# Patient Record
(demographics unavailable — no encounter records)

---

## 2018-01-11 NOTE — MISCELLANEOUS
History of Present Illness  COPD Hospital Discharge Initial Follow-Up Call: The patient is being contacted for follow-up after hospitalization  The date of discharge is 6/19/2016  I spoke with Elvin Villanueva lead [address] [place] [address] [facility]   The patient was discharged to [facility]  The patient is a 61 PPY smoker  The patient is experiencing the following symptoms: dyspnea and fever, but no cough  Pulse oximetry keep sats >90% and with oxygen at 2 LPM    Zone tool status is yellow  Narrative Summary:    Spoke with lead [address] [place] [address] Road who reports pt is having some mild ADAMS, O2 to keep sats >90%  Receiving neb medications of Atorvent, Pulmicort and Xopenex as well as prednisone and azithromycin  Having sanguinous pinpt drng from old (Jan, 2016) L)lateral ankle ORIF site as well as low grade temps  Seen by facility CRNP today, orders for sputum and blood cultures as well as CXR and ankle xray received  Adm assessment due 6/24-- progress on goals of therapy, ADL's will be discussed at first team meeting        Signatures   Electronically signed by : Mirian Garcia, ; Jun 22 2016  1:14PM EST                       (Author)

## 2018-01-12 NOTE — MISCELLANEOUS
History of Present Illness  HPI: Pt was discharged to [facility]  Attempted to call for RAMU SUERO call-- unit phone number rang several times without an answer  Will reach out to [facility] to attempt to establish       [address] Discharge Initial Follow-Up Call: The patient is being contacted for follow-up after hospitalization  The date of discharge is 6/17/2016  The patient was discharged to [facility]           Signatures   Electronically signed by : Ophelia Gallego, ; Jun 20 2016  3:50PM EST                       (Author)

## 2018-01-16 NOTE — PROCEDURES
Procedures by MARY KAY Henry at 5/11/2016  12:20 PM      Author:  MARY KAY Avina Service:  (none) Author Type:  Speech and Language Pathologist     Filed:  5/11/2016  1:27 PM Date of Service:  5/11/2016 12:20 PM Status:  Signed     :  MARY KAY Henry (Speech and Language Pathologist)         Procedures:       1  FL BARIUM Marlinda Conroy SPEECH [[id number] (Custom)]                                                      Video Swallow Study      Patient Name: Spencer Vital  PKJBN'N Date: 5/11/2016  par  par  Past Medical History  Past Medical History      Diagnosis   Date    A-fib       no AC d/t fall risk     Acute on chronic respiratory failure with hypoxemia      Ambulatory dysfunction      Anemia of chronic disease      CAD (coronary artery disease)      Cardiac disease      CHF (congestive heart failure)       diastolic     CKD (chronic kidney disease) stage 3, GFR 30-59 ml/min      COPD (chronic obstructive pulmonary disease)      GERD (gastroesophageal reflux disease)      History of tobacco abuse      HLD (hyperlipidemia)      Hyperglycemia      Hypertension      Peripheral neuropathy      PVD (peripheral vascular disease)      Urinary retention      Vitamin D deficiency          Past Surgical History  Past Surgical History       Procedure   Laterality Date    Appendectomy       Cholecystectomy       Cataract extraction       Renal artery stent  Left     Abdominal aortic aneurysm repair       Coronary angioplasty with stent placement   2007     Circumflex      Myringotomy       Neck surgery        To remove the soft tissue mass      Orif ankle fracture            General Information:    67 yo female referred  for a VBS by Jose Sosa PA-C for dysphagia, r/o aspiration and determine risk  Cognition: awake, alert  Cooperative  Speech/Swallow Mech: Oral motor movements appeared  WNL; Dentition was adequate; Cough was strong   Respiratory Status: 3L on NC;   Current diet: dysphagia 3 w/ NTL  Prior VBS none  Pt was seen in radiology for a Video Barium Swallow Study, seated in the upright position and viewed laterally with the following consistencies: puree, soft, HTL, NTL, thin liquid, whole pill in applesauce  Results are as follows:           Oral Stage:   bolus retrieval, mastication, manipulation, oral control of liquids by cup and straw appeard WNL  Pharyngeal Stage:   Swallow iniation was timely w/ complete airway closure  Mild pharyngeal retention around vallecula, cleared w/ liquid wash  Single episode of silent aspiration noted w/ thin liquids-delayed cough w/ expectoration of mucous; no further episodes  of penetration or aspiration noted w/ several oz by cup and straw  No difficulty w/ whole pill in puree  Esophageal Stage:   screened, WFL       Assessment Summary:   Oral and pharyngeal stages appear WFL, w/ single episode of small amount of silent aspiration w/ thin liquids  No significant risk is noted       Diagnosis/Prognosis:            Recommendations:   Dysphagia level 3 diet w/ thin liquids  Small, single sips of thin liquids, espec when SOB  Cont meds whole in puree  Aspiration precautions  Will cont to follow                           Received for:Provider  EPIC   May 11 2016  1:27PM [facility] Standard Time

## 2018-03-07 NOTE — MISCELLANEOUS
History of Present Illness  COPD Subsequent Hospital Discharge Phone Calls: The patient is being contacted for continued follow-up after hospitalization  The patient was discharged from the hospital on 6/19/16  I spoke with Eve Bronson  Patient was identified as medium risk for readmission per risk stratification  The patient was discharged to home with [address] [place] [address]  The patient is a 3/day PPD smoker  mMRC Scale Rating:   III -- Stop for breath after walking about 100 yrds or after a few minutes on level ground  Counseling and topics reviewed:  Brovana, Pulmicort, Levalbuterol  Narrative summary:  Patient was discharged from [facility] to [facility]  RN comes in everyday  Daughter also stop in regularly  Patient has trouble walking and needs assistance  All meds being taken and given as needed  Future Appointments    Date/Time Provider Specialty Site   08/12/2016 01:00 PM Devan Tovar DO Nephrology [facility]     Signatures   Electronically signed by :  RT Kimberley; Jul 13 2016  3:09PM EST                       (Author)

## 2021-01-15 DIAGNOSIS — Z23 NEED FOR VACCINATION: ICD-10-CM
